# Patient Record
Sex: MALE | Race: WHITE | NOT HISPANIC OR LATINO | Employment: PART TIME | URBAN - METROPOLITAN AREA
[De-identification: names, ages, dates, MRNs, and addresses within clinical notes are randomized per-mention and may not be internally consistent; named-entity substitution may affect disease eponyms.]

---
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
## 2021-10-19 ENCOUNTER — OFFICE VISIT (OUTPATIENT)
Dept: URGENT CARE | Facility: URGENT CARE | Age: 21
End: 2021-10-19
Payer: COMMERCIAL

## 2021-10-19 ENCOUNTER — TELEPHONE (OUTPATIENT)
Dept: FAMILY MEDICINE | Facility: CLINIC | Age: 21
End: 2021-10-19

## 2021-10-19 VITALS
DIASTOLIC BLOOD PRESSURE: 72 MMHG | RESPIRATION RATE: 16 BRPM | TEMPERATURE: 97.6 F | SYSTOLIC BLOOD PRESSURE: 138 MMHG | HEART RATE: 67 BPM | OXYGEN SATURATION: 99 %

## 2021-10-19 DIAGNOSIS — T63.481A LOCAL REACTION TO INSECT STING, ACCIDENTAL OR UNINTENTIONAL, INITIAL ENCOUNTER: Primary | ICD-10-CM

## 2021-10-19 PROCEDURE — 99213 OFFICE O/P EST LOW 20 MIN: CPT | Performed by: PHYSICIAN ASSISTANT

## 2021-10-19 RX ORDER — PREDNISONE 20 MG/1
40 TABLET ORAL DAILY
Qty: 6 TABLET | Refills: 0 | Status: SHIPPED | OUTPATIENT
Start: 2021-10-19 | End: 2021-10-22

## 2021-10-19 NOTE — TELEPHONE ENCOUNTER
Reason for call:  Patient reporting a symptom    Symptom or request: Mom says Mj was stung by a bee yesterday on his forearm.The swelling is traveling up his arm but not past the joint. She is wondering if he needs to be seen for this.     Duration (how long have symptoms been present): Yesterday  Phone Number patient can be reached at:  Call Hoda at [phone]    Best Time:  anytime    Can we leave a detailed message on this number:  YES    Call taken on 10/19/2021 at 10:00 AM by Neela Velazquez

## 2021-10-19 NOTE — TELEPHONE ENCOUNTER
Mom called stated she still have not received a call back. Patient is driving to [facility] right now.    Christy Montoya on 10/19/2021 at 10:34 AM

## 2021-10-19 NOTE — LETTER
[facility] [place] [id number] [address]  Phone: [phone]  Fax: [phone]    October 19, 2021        Mj Rice  [address] [facility] [id number]          To whom it may concern:    RE: Mj Rice    Patient was seen and treated today at our clinic and missed work  10/19/21 and 10/20/21.    Please contact me for questions or concerns.      Sincerely,        Omayra Jaimes PA-C

## 2023-04-23 ENCOUNTER — HOSPITAL ENCOUNTER (EMERGENCY)
Facility: CLINIC | Age: 23
Discharge: HOME | End: 2023-04-23
Attending: EMERGENCY MEDICINE | Admitting: EMERGENCY MEDICINE
Payer: COMMERCIAL

## 2023-04-23 VITALS
BODY MASS INDEX: 22.21 KG/M2 | TEMPERATURE: 98.8 F | DIASTOLIC BLOOD PRESSURE: 80 MMHG | RESPIRATION RATE: 18 BRPM | SYSTOLIC BLOOD PRESSURE: 133 MMHG | WEIGHT: 164 LBS | OXYGEN SATURATION: 99 % | HEART RATE: 69 BPM | HEIGHT: 72 IN

## 2023-04-23 DIAGNOSIS — S01.81XA FACIAL LACERATION, INITIAL ENCOUNTER: ICD-10-CM

## 2023-04-23 DIAGNOSIS — S01.01XA LACERATION OF SCALP, INITIAL ENCOUNTER: ICD-10-CM

## 2023-04-23 PROCEDURE — 99283 EMERGENCY DEPT VISIT LOW MDM: CPT | Performed by: EMERGENCY MEDICINE

## 2023-04-23 ASSESSMENT — ENCOUNTER SYMPTOMS
NECK STIFFNESS: 0
FEVER: 0
NECK PAIN: 0
WOUND: 1
NAUSEA: 0
BRUISES/BLEEDS EASILY: 0
WEAKNESS: 0
LIGHT-HEADEDNESS: 0
VOMITING: 0
PHOTOPHOBIA: 0
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
